# Patient Record
Sex: MALE | Race: WHITE | NOT HISPANIC OR LATINO | Employment: FULL TIME | URBAN - METROPOLITAN AREA
[De-identification: names, ages, dates, MRNs, and addresses within clinical notes are randomized per-mention and may not be internally consistent; named-entity substitution may affect disease eponyms.]

---

## 2018-04-12 ENCOUNTER — HOSPITAL ENCOUNTER (EMERGENCY)
Facility: HOSPITAL | Age: 33
Discharge: HOME | End: 2018-04-13
Attending: EMERGENCY MEDICINE
Payer: COMMERCIAL

## 2018-04-12 DIAGNOSIS — N50.819 TESTICULAR PAIN: Primary | ICD-10-CM

## 2018-04-12 PROCEDURE — 99284 EMERGENCY DEPT VISIT MOD MDM: CPT | Mod: 25

## 2018-04-12 PROCEDURE — 96372 THER/PROPH/DIAG INJ SC/IM: CPT

## 2018-04-12 RX ORDER — OXYCODONE AND ACETAMINOPHEN 5; 325 MG/1; MG/1
1 TABLET ORAL
Status: COMPLETED | OUTPATIENT
Start: 2018-04-12 | End: 2018-04-13

## 2018-04-12 RX ORDER — LORATADINE 10 MG/1
10 TABLET ORAL DAILY
COMMUNITY

## 2018-04-13 VITALS
HEIGHT: 70 IN | RESPIRATION RATE: 18 BRPM | BODY MASS INDEX: 28.63 KG/M2 | OXYGEN SATURATION: 96 % | DIASTOLIC BLOOD PRESSURE: 79 MMHG | HEART RATE: 72 BPM | TEMPERATURE: 98 F | SYSTOLIC BLOOD PRESSURE: 137 MMHG | WEIGHT: 200 LBS

## 2018-04-13 LAB
AMORPH CRY URNS QL MICRO: ABNORMAL
BACTERIA #/AREA URNS HPF: ABNORMAL /HPF
BILIRUB UR QL STRIP: NEGATIVE
C TRACH DNA SPEC QL NAA+PROBE: DETECTED
CLARITY UR: ABNORMAL
COLOR UR: YELLOW
GLUCOSE UR QL STRIP: NEGATIVE
HGB UR QL STRIP: NEGATIVE
KETONES UR QL STRIP: NEGATIVE
LEUKOCYTE ESTERASE UR QL STRIP: ABNORMAL
MICROSCOPIC COMMENT: ABNORMAL
N GONORRHOEA DNA SPEC QL NAA+PROBE: NOT DETECTED
NITRITE UR QL STRIP: NEGATIVE
PH UR STRIP: 7 [PH] (ref 5–8)
PROT UR QL STRIP: NEGATIVE
SP GR UR STRIP: 1.02 (ref 1–1.03)
URN SPEC COLLECT METH UR: ABNORMAL
UROBILINOGEN UR STRIP-ACNC: ABNORMAL EU/DL
WBC #/AREA URNS HPF: 4 /HPF (ref 0–5)

## 2018-04-13 PROCEDURE — 87491 CHLMYD TRACH DNA AMP PROBE: CPT

## 2018-04-13 PROCEDURE — 81000 URINALYSIS NONAUTO W/SCOPE: CPT

## 2018-04-13 PROCEDURE — 63600175 PHARM REV CODE 636 W HCPCS: Performed by: PHYSICIAN ASSISTANT

## 2018-04-13 PROCEDURE — 25000003 PHARM REV CODE 250: Performed by: PHYSICIAN ASSISTANT

## 2018-04-13 PROCEDURE — 87086 URINE CULTURE/COLONY COUNT: CPT

## 2018-04-13 RX ORDER — DOXYCYCLINE 100 MG/1
100 CAPSULE ORAL 2 TIMES DAILY
Qty: 20 CAPSULE | Refills: 0 | Status: SHIPPED | OUTPATIENT
Start: 2018-04-13 | End: 2018-04-23

## 2018-04-13 RX ORDER — CEFTRIAXONE 250 MG/1
250 INJECTION, POWDER, FOR SOLUTION INTRAMUSCULAR; INTRAVENOUS
Status: COMPLETED | OUTPATIENT
Start: 2018-04-13 | End: 2018-04-13

## 2018-04-13 RX ORDER — LIDOCAINE HYDROCHLORIDE 10 MG/ML
5 INJECTION INFILTRATION; PERINEURAL
Status: COMPLETED | OUTPATIENT
Start: 2018-04-13 | End: 2018-04-13

## 2018-04-13 RX ORDER — OXYCODONE AND ACETAMINOPHEN 5; 325 MG/1; MG/1
1 TABLET ORAL EVERY 6 HOURS PRN
Qty: 12 TABLET | Refills: 0 | OUTPATIENT
Start: 2018-04-13 | End: 2019-04-03

## 2018-04-13 RX ADMIN — LIDOCAINE HYDROCHLORIDE 5 ML: 10 INJECTION, SOLUTION INFILTRATION; PERINEURAL at 01:04

## 2018-04-13 RX ADMIN — OXYCODONE HYDROCHLORIDE AND ACETAMINOPHEN 1 TABLET: 5; 325 TABLET ORAL at 12:04

## 2018-04-13 RX ADMIN — CEFTRIAXONE SODIUM 250 MG: 250 INJECTION, POWDER, FOR SOLUTION INTRAMUSCULAR; INTRAVENOUS at 01:04

## 2018-04-13 NOTE — ED NOTES
Pt denies any reaction; pt notified to let nurse know of any symptoms; family member at bedside; pt and family member verbalized understanding

## 2018-04-13 NOTE — DISCHARGE INSTRUCTIONS
Follow-up with urology early next week for reevaluation. Return to this ED if symptoms worsen, if you begin with fever, if any other problems occur.    Take antibiotics as prescribed, testicular elevation, percocet for pain.

## 2018-04-13 NOTE — ED NOTES
Per VANIA Mckoy; wants pt to stay at least 30-45 min after receiving rocephin injection to watch for allergic reaction. Documented allergy was as a child

## 2018-04-13 NOTE — ED TRIAGE NOTES
"Pt states "My right testicle has been swollen for 2 days. It feels like I'm getting punched in it". C/o right testicle pain and swelling, states pain goes up to his pelvic area. Denies dysuria/penile discharge. Denies trauma/abdominal pain. Denies taking meds PTA  "

## 2018-04-13 NOTE — ED PROVIDER NOTES
"Encounter Date: 4/12/2018       History     Chief Complaint   Patient presents with    Testicle Pain     pt reports RIGHT sided testicle pain and swelling starting 2 days ago; pt denies hx with this problems and reports that he has been using BC powder with some relief    Groin Swelling      CC: Testicle Pain, Groin Swelling  HPI: This 33 y.o. male presents to the ED c/o severe constant R testicle pain he noticed when he woke up 2 days ago. Pt states that it had swelled up to the size of grapefruit, but this has improved some today. Pt states that the pain began to radiate to R inguinal region today. He denies any Hx of similar Sx. He works as a  on a ship. He denies difficulty urinating, penile discharge, constipation, abdominal pain. He developed a "bad migraine" today.      The history is provided by the patient.     Review of patient's allergies indicates:   Allergen Reactions    Penicillins      Other reaction(s): Hives     Past Medical History:   Diagnosis Date    Allergy     Astigmatism of both eyes     COPD (chronic obstructive pulmonary disease)     Hyperlipidemia      Past Surgical History:   Procedure Laterality Date    FRACTURE SURGERY  2011    right hand     Family History   Problem Relation Age of Onset    Diabetes Maternal Grandmother     Cancer Maternal Grandfather      lung    Heart disease Paternal Grandmother     Heart disease Paternal Grandfather      Social History   Substance Use Topics    Smoking status: Former Smoker     Packs/day: 1.00     Types: Cigarettes     Quit date: 1/1/2016    Smokeless tobacco: Not on file    Alcohol use No     Review of Systems   Constitutional: Negative for chills and fever.   HENT: Negative for ear pain, rhinorrhea and sore throat.    Eyes: Negative for pain and visual disturbance.   Respiratory: Negative for cough and shortness of breath.    Cardiovascular: Negative for chest pain.   Gastrointestinal: Negative for abdominal pain, " constipation, diarrhea, nausea and vomiting.   Genitourinary: Positive for scrotal swelling (R) and testicular pain (R). Negative for dysuria and flank pain.        (+) R inguinal pain   Musculoskeletal: Negative for arthralgias and myalgias.   Skin: Negative for color change and rash.   Neurological: Positive for headaches. Negative for syncope.       Physical Exam     Initial Vitals [04/12/18 2148]   BP Pulse Resp Temp SpO2   (!) 142/74 70 18 98 °F (36.7 °C) 98 %      MAP       96.67         Physical Exam    Nursing note and vitals reviewed.  Constitutional: He appears well-developed and well-nourished. He is not diaphoretic. No distress.   HENT:   Head: Normocephalic and atraumatic.   Eyes: Conjunctivae and EOM are normal. Pupils are equal, round, and reactive to light.   Neck: Normal range of motion. Neck supple.   Cardiovascular: Normal heart sounds.   Pulmonary/Chest: Breath sounds normal. No respiratory distress. He has no wheezes.   Abdominal: Soft. Bowel sounds are normal. He exhibits no distension and no mass. There is no tenderness. There is no rebound and no guarding. Hernia confirmed negative in the right inguinal area and confirmed negative in the left inguinal area.   Genitourinary: Penis normal. Right testis shows swelling and tenderness. Circumcised. No penile tenderness. No discharge found.   Genitourinary Comments: Mild R-sided posterior scrotal wall erythema/ttp. No bag of worms. No relief with opening book. No relief with elevation of testicle.    Lymphadenopathy: No inguinal adenopathy noted on the right or left side.   Neurological: He is alert and oriented to person, place, and time. He has normal strength.   Skin: Skin is warm and dry. Capillary refill takes less than 2 seconds. No rash and no abscess noted. No erythema.   Psychiatric: He has a normal mood and affect. His behavior is normal. Judgment and thought content normal.         ED Course   Procedures  Labs Reviewed   URINALYSIS -  Abnormal; Notable for the following:        Result Value    Appearance, UA Cloudy (*)     Urobilinogen, UA 2.0-3.0 (*)     Leukocytes, UA Trace (*)     All other components within normal limits   URINALYSIS MICROSCOPIC - Abnormal; Notable for the following:     Bacteria, UA Few (*)     All other components within normal limits   CULTURE, URINE   C. TRACHOMATIS/N. GONORRHOEAE BY AMP DNA             Medical Decision Making:   ED Management:  32yo M with chief complaint R testicular pain and swelling x 3 days. No trauma. No dysuria/hematuria. No penile discharge, pain, or swelling. Pain radiates from inguinal area into groin. D/D includes inguinal hernia, scrotal cellulitis, testicular torsion, epididymitis, orchitis.  Patient overall well-appearing and nontoxic.  No significant belly tenderness on exam.  No inguinal lymphadenopathy.  There is right-sided scrotal swelling with some mild erythema to posterior scrotal wall.  No significant scrotal wall thickening.  R testicle is exquisitely tender.  No bag of worms sensation.  No obvious inguinal hernia.  He denies history of hernia.  He does state that he does some heavy lifting at work.  No urinary complaints. U/S with epididymitis. Pt denies high risk sexual behaviors or anal insertion. Low suspicion for enteric organism. Rocephin and Doxycycline. Pt to f/u with urology. Percocet for pain. He does understand and agree. Vitals reassuring.  Return precautions given.  Urine cultured.  Other:   I have discussed this case with another health care provider.       <> Summary of the Discussion: I have discussed this case with .                      Clinical Impression:   The encounter diagnosis was Testicular pain.    Disposition:   Disposition: Discharged  Condition: Stable                        Ean Church PA-C  04/13/18 0605

## 2018-04-15 LAB — BACTERIA UR CULT: NO GROWTH

## 2019-04-03 ENCOUNTER — HOSPITAL ENCOUNTER (EMERGENCY)
Facility: HOSPITAL | Age: 34
Discharge: HOME | End: 2019-04-03
Attending: EMERGENCY MEDICINE
Payer: COMMERCIAL

## 2019-04-03 VITALS
DIASTOLIC BLOOD PRESSURE: 88 MMHG | TEMPERATURE: 98 F | WEIGHT: 210 LBS | HEIGHT: 70 IN | BODY MASS INDEX: 30.06 KG/M2 | SYSTOLIC BLOOD PRESSURE: 136 MMHG | RESPIRATION RATE: 18 BRPM | OXYGEN SATURATION: 99 % | HEART RATE: 74 BPM

## 2019-04-03 DIAGNOSIS — R60.9 SWELLING: ICD-10-CM

## 2019-04-03 DIAGNOSIS — M23.90 DERANGEMENT OF KNEE, UNSPECIFIED LATERALITY: Primary | ICD-10-CM

## 2019-04-03 DIAGNOSIS — T14.90XA TRAUMA: ICD-10-CM

## 2019-04-03 LAB
ALBUMIN SERPL-MCNC: 4.2 G/DL
ALP SERPL-CCNC: 66 U/L
BILIRUB SERPL-MCNC: 0.7 MG/DL
BUN SERPL-MCNC: 14 MG/DL
CALCIUM SERPL-MCNC: 9.7 MG/DL
CHLORIDE SERPL-SCNC: 104 MMOL/L
CREAT SERPL-MCNC: 0.9 MG/DL
GLUCOSE SERPL-MCNC: 96 MG/DL (ref 70–110)
POC ALT (SGPT): 71 U/L
POC AST (SGOT): 48 U/L
POC PTINR: 1.1 (ref 0.9–1.2)
POC PTWBT: 12.6 SEC (ref 9.7–14.3)
POC TCO2: 26 MMOL/L
POTASSIUM BLD-SCNC: 3.8 MMOL/L
PROTEIN, POC: 7.5 G/DL
SAMPLE: NORMAL
SODIUM BLD-SCNC: 146 MMOL/L

## 2019-04-03 PROCEDURE — 99284 EMERGENCY DEPT VISIT MOD MDM: CPT | Mod: 25,ER

## 2019-04-03 PROCEDURE — 80053 COMPREHEN METABOLIC PANEL: CPT | Mod: ER

## 2019-04-03 PROCEDURE — 85610 PROTHROMBIN TIME: CPT | Mod: ER

## 2019-04-03 PROCEDURE — 29505 APPLICATION LONG LEG SPLINT: CPT | Mod: LT,ER

## 2019-04-03 PROCEDURE — 85025 COMPLETE CBC W/AUTO DIFF WBC: CPT | Mod: ER

## 2019-04-03 RX ORDER — IBUPROFEN 800 MG/1
800 TABLET ORAL EVERY 8 HOURS PRN
Qty: 30 TABLET | Refills: 0 | Status: SHIPPED | OUTPATIENT
Start: 2019-04-03 | End: 2021-07-21 | Stop reason: CLARIF

## 2019-04-03 RX ORDER — OXYCODONE AND ACETAMINOPHEN 10; 325 MG/1; MG/1
1 TABLET ORAL EVERY 4 HOURS PRN
Qty: 18 TABLET | Refills: 0 | Status: SHIPPED | OUTPATIENT
Start: 2019-04-03 | End: 2021-07-21 | Stop reason: CLARIF

## 2019-04-03 NOTE — ED NOTES
Patient stated he was walking and fell last Friday and hit left knee   @ days ago patient noticed left leg was swollen and tturning ecchymotic

## 2019-04-03 NOTE — ED PROVIDER NOTES
"Encounter Date: 4/3/2019    SCRIBE #1 NOTE: I, Marilyn Ferrari, am scribing for, and in the presence of,  Glenan Dyson NP. I have scribed the following portions of the note - Other sections scribed: HPI, ROS, PE.       History     Chief Complaint   Patient presents with    Leg Swelling     pt reports swelling to left lower leg that started yesterday and a purple coloring that started today just below left knee. He states he did hurt his left knee 1 week ago and the swelling has resolved     Michael Mcadams is a 34 y.o. male who  has a past medical history of Allergy, Astigmatism of both eyes, COPD (chronic obstructive pulmonary disease), and Hyperlipidemia.    The patient presents to the ED due to left leg swelling with an onset of yesterday. The patient reports of a trip and fall that occurred last week. He reports his left knee cap hit the stairs after he fell. He denies head injury or LOC. He reports of left knee pain with bruising and swelling at time of incident, but has resolved. However, the patient reports his symptoms have returned with increased swelling to LLE since yesterday. He states, "my skin feels hot, tight, and numb." He reports of purple discoloration to left thigh that began approximately 1 hour ago. He reports of no relief with Ibuprofen. The patient denies SOB, chest pain, nausea, or any other complaint at this time.    The history is provided by the patient.     Review of patient's allergies indicates:   Allergen Reactions    Penicillins      Other reaction(s): Hives    Soma [carisoprodol] Hives     Past Medical History:   Diagnosis Date    Allergy     Astigmatism of both eyes     COPD (chronic obstructive pulmonary disease)     Hyperlipidemia      Past Surgical History:   Procedure Laterality Date    FRACTURE SURGERY  2011    right hand     Family History   Problem Relation Age of Onset    Diabetes Maternal Grandmother     Cancer Maternal Grandfather         lung    Heart disease " Paternal Grandmother     Heart disease Paternal Grandfather      Social History     Tobacco Use    Smoking status: Former Smoker     Packs/day: 1.00     Types: Cigarettes     Last attempt to quit: 1/1/2016     Years since quitting: 3.2   Substance Use Topics    Alcohol use: No    Drug use: No     Review of Systems   Constitutional: Negative for chills and fever.   HENT: Negative for congestion, ear pain, rhinorrhea and sore throat.    Respiratory: Negative for cough, shortness of breath and wheezing.    Cardiovascular: Positive for leg swelling (left leg). Negative for chest pain and palpitations.   Gastrointestinal: Negative for abdominal pain, diarrhea, nausea and vomiting.   Genitourinary: Negative for dysuria and hematuria.   Musculoskeletal: Positive for joint swelling. Negative for back pain, myalgias and neck pain.        (+) left knee pain   Skin: Positive for color change. Negative for rash.   Neurological: Negative for dizziness, weakness, light-headedness and headaches.   Psychiatric/Behavioral: Negative for confusion.       Physical Exam     Initial Vitals [04/03/19 1810]   BP Pulse Resp Temp SpO2   (!) 144/93 74 18 97.8 °F (36.6 °C) 99 %      MAP       --         Physical Exam    Nursing note and vitals reviewed.  Constitutional: He appears well-developed and well-nourished.   HENT:   Head: Normocephalic and atraumatic.   Eyes: EOM are normal. Pupils are equal, round, and reactive to light.   Neck: Normal range of motion. Neck supple.   Cardiovascular: Normal rate, regular rhythm and normal heart sounds.   Pulmonary/Chest: Breath sounds normal. No respiratory distress.   Abdominal: Soft. Bowel sounds are normal. There is no tenderness.   Musculoskeletal: Normal range of motion. He exhibits edema and tenderness.   significant edema to LLE  Left knee with effusion  Moderate ecchymosis to lateral and posterior aspect of left knee.    Neurological: He is alert and oriented to person, place, and time. He  has normal strength.   Skin: Skin is warm and dry. Capillary refill takes less than 2 seconds.         ED Course   Procedures  Labs Reviewed   POCT CBC   POCT PROTIME-INR   POCT CMP   ISTAT PROCEDURE   POCT CMP          Imaging Results          US Lower Extremity Veins Left (Final result)  Result time 04/03/19 19:51:00   Procedure changed from US Lower Extremity Veins Bilateral     Final result by Bianca Pizarro MD (04/03/19 19:51:00)                 Impression:      No evidence of left lower extremity deep venous thrombosis.      Electronically signed by: Bianca Pizarro  Date:    04/03/2019  Time:    19:51             Narrative:    EXAMINATION:  Ultrasound lower extremity veins left    CLINICAL HISTORY:  Swelling    TECHNIQUE:  Grayscale imaging of the left lower extremity to evaluate the deep and superficial venous system with color Doppler interrogation and Spectral Doppler wave form analysis was performed.    COMPARISON:  None.    FINDINGS:  There is normal color Doppler interrogation, compression and distal augmentation of the left common femoral, femoral, popliteal, gastrocnemius, posterior tibial, peroneal and saphenous veins.  The left femoral vein is duplicated.  Pitting edema is noted at the shin area.                               X-Ray Knee 3 View Left (Final result)  Result time 04/03/19 19:04:25    Final result by Adrien Pacheco MD (04/03/19 19:04:25)                 Impression:      1. No acute displaced fracture or dislocation of the knee.      Electronically signed by: Adrien Pacheco MD  Date:    04/03/2019  Time:    19:04             Narrative:    EXAMINATION:  XR KNEE 3 VIEW LEFT    CLINICAL HISTORY:  Injury, unspecified, initial encounter    TECHNIQUE:  AP, lateral, and Merchant views of the left knee were performed.    COMPARISON:  None    FINDINGS:  Three views.    No acute displaced fracture or dislocation of the knee.  No radiopaque foreign body.  No large knee joint effusion.   There may be a non ossified fibroma involving the lateral aspect of the proximal tibia.                                 Medical Decision Making:   Initial Assessment:   34-year-old male presents with left leg swelling since yesterday.  Differential Diagnosis:   DVT, contusion, effusion  Clinical Tests:   Lab Tests: Ordered and Reviewed  Radiological Study: Ordered and Reviewed  ED Management:  Diagnosis management comments: This is an urgent evaluation of a  that presented to the ER with c/o . Pts exam was as above.     Labs  were reviewed and discussed with pt.  INR is within normal limits. Platelets are normal at 226.  H&H is at 14/42.  Liver enzymes are slightly 71/48 however I do not believe there responsible for this increase in ecchymosis.    Based on history today's exam I believe most likely there was a derangement of the knee 2 weeks ago with injury and the bleeding/ecchymosis is finally left the knee capsule as tracking down the calf.  There is no DVT on ultrasound.  There is no fracture on x-ray.  I will place patient in a knee immobilizer and sent him out with pain control and instructions to follow up with Orthopedics.    Based on exam today - I have low suspicion for medical, surgical or other life threatening condition and I believe pt is safe for discharge and outpatient f/u.    Pt verbalizes understanding of d/c instructions and will return for worsening condition.    Dr Brambila, ED Attending, consulted, examined the patient independently and agrees with assessment and plan.                      Clinical Impression:       ICD-10-CM ICD-9-CM   1. Derangement of knee, unspecified laterality M23.90 717.9   2. Swelling R60.9 782.3   3. Trauma T14.90XA 959.9     Disposition:   Disposition: Discharged  Condition: Stable    I, CHRISTINE Dyson, personally performed the services described in this documentation.  All medical record entries made by the scribe were at my direction and in my presence.  I have reviewed  the chart and agree that the record reflects my personal performance and is accurate and complete.                       Glenna Dyson NP  04/03/19 2047

## 2020-07-13 ENCOUNTER — HOSPITAL ENCOUNTER (EMERGENCY)
Facility: HOSPITAL | Age: 35
Discharge: HOME | End: 2020-07-13
Attending: EMERGENCY MEDICINE
Payer: OTHER MISCELLANEOUS

## 2020-07-13 VITALS
OXYGEN SATURATION: 95 % | HEART RATE: 74 BPM | BODY MASS INDEX: 30.78 KG/M2 | WEIGHT: 215 LBS | DIASTOLIC BLOOD PRESSURE: 71 MMHG | RESPIRATION RATE: 18 BRPM | HEIGHT: 70 IN | TEMPERATURE: 98 F | SYSTOLIC BLOOD PRESSURE: 121 MMHG

## 2020-07-13 DIAGNOSIS — S90.32XA CONTUSION OF LEFT HEEL, INITIAL ENCOUNTER: Primary | ICD-10-CM

## 2020-07-13 DIAGNOSIS — T07.XXXA MULTIPLE ABRASIONS: ICD-10-CM

## 2020-07-13 DIAGNOSIS — R52 PAIN: ICD-10-CM

## 2020-07-13 DIAGNOSIS — S90.31XA CONTUSION OF RIGHT HEEL, INITIAL ENCOUNTER: ICD-10-CM

## 2020-07-13 PROCEDURE — 63600175 PHARM REV CODE 636 W HCPCS: Performed by: NURSE PRACTITIONER

## 2020-07-13 PROCEDURE — 90715 TDAP VACCINE 7 YRS/> IM: CPT | Performed by: NURSE PRACTITIONER

## 2020-07-13 PROCEDURE — 25000003 PHARM REV CODE 250: Performed by: NURSE PRACTITIONER

## 2020-07-13 PROCEDURE — 90471 IMMUNIZATION ADMIN: CPT | Performed by: NURSE PRACTITIONER

## 2020-07-13 PROCEDURE — 99284 EMERGENCY DEPT VISIT MOD MDM: CPT | Mod: 25

## 2020-07-13 RX ORDER — OXYCODONE AND ACETAMINOPHEN 5; 325 MG/1; MG/1
1 TABLET ORAL
Status: COMPLETED | OUTPATIENT
Start: 2020-07-13 | End: 2020-07-13

## 2020-07-13 RX ORDER — LIDOCAINE HYDROCHLORIDE 10 MG/ML
10 INJECTION INFILTRATION; PERINEURAL
Status: COMPLETED | OUTPATIENT
Start: 2020-07-13 | End: 2020-07-13

## 2020-07-13 RX ORDER — HYDROCODONE BITARTRATE AND ACETAMINOPHEN 5; 325 MG/1; MG/1
1 TABLET ORAL EVERY 6 HOURS PRN
Qty: 12 TABLET | Refills: 0 | Status: SHIPPED | OUTPATIENT
Start: 2020-07-13 | End: 2021-07-21 | Stop reason: CLARIF

## 2020-07-13 RX ADMIN — CLOSTRIDIUM TETANI TOXOID ANTIGEN (FORMALDEHYDE INACTIVATED), CORYNEBACTERIUM DIPHTHERIAE TOXOID ANTIGEN (FORMALDEHYDE INACTIVATED), BORDETELLA PERTUSSIS TOXOID ANTIGEN (GLUTARALDEHYDE INACTIVATED), BORDETELLA PERTUSSIS FILAMENTOUS HEMAGGLUTININ ANTIGEN (FORMALDEHYDE INACTIVATED), BORDETELLA PERTUSSIS PERTACTIN ANTIGEN, AND BORDETELLA PERTUSSIS FIMBRIAE 2/3 ANTIGEN 0.5 ML: 5; 2; 2.5; 5; 3; 5 INJECTION, SUSPENSION INTRAMUSCULAR at 06:07

## 2020-07-13 RX ADMIN — LIDOCAINE HYDROCHLORIDE 10 ML: 10 INJECTION, SOLUTION INFILTRATION; PERINEURAL at 06:07

## 2020-07-13 RX ADMIN — OXYCODONE HYDROCHLORIDE AND ACETAMINOPHEN 1 TABLET: 5; 325 TABLET ORAL at 06:07

## 2020-07-13 NOTE — PROVIDER PROGRESS NOTES - EMERGENCY DEPT.
Emergency Department TeleTRIAGE Encounter Note      CHIEF COMPLAINT    Chief Complaint   Patient presents with    Ankle Pain     Pt c/o right ankle pain secondary to a accident at work a wire had snapped and swapped across pt ankle and knee. minimal laceration tp right heel bleeding controlled with bandage       VITAL SIGNS   Initial Vitals [07/13/20 1435]   BP Pulse Resp Temp SpO2   132/82 104 18 98 °F (36.7 °C) 95 %      MAP       --            ALLERGIES    Review of patient's allergies indicates:   Allergen Reactions    Penicillins      Other reaction(s): Hives    Soma [carisoprodol] Hives       PROVIDER TRIAGE NOTE  This is a teletriage evaluation of a 35 y.o. male presenting to the ED with c/o right knee and heel pain following an injury.  He reports a large 1 in wire snapped back and hit him in the knee and heel.  Has laceration to the heel.  Does not recall his last tetanus.. Initial orders will be placed and care will be transferred to an alternate provider when patient is roomed for a full evaluation. Any additional orders and the final disposition will be determined by that provider.         ORDERS  Labs Reviewed - No data to display    ED Orders (720h ago, onward)    Start Ordered     Status Ordering Provider    07/14/20 0600 07/13/20 1459  Wound care routine - Clean wound  Daily     Comments: Clean Wound    Ordered DEJAN GOULD    07/14/20 0600 07/13/20 1459  Wound care routine - Irrigate wound  Daily     Comments: Irrigate Wound    Ordered DEJAN GOULD    07/14/20 0600 07/13/20 1459  Wound care routine - Sterile Gloves to Bedside  Daily     Comments: Provide sterile gloves to bedside    Ordered DEJAN GOULD    07/13/20 1500 07/13/20 1459  X-Ray Knee 3 View Right  1 time imaging      Ordered DEJAN GOULD    07/13/20 1500 07/13/20 1459  X-Ray Foot Complete Right  1 time imaging      Ordered DEJAN GOULD    07/13/20 1500 07/13/20 1459  Provide suture tray to patient bedside  Once       Ordered DEJAN GOULD    07/13/20 1500 07/13/20 1459  Change dressing - apply dry sterile dressing  Once     Comments: Apply dry sterile dressing.    Ordered DEJAN GOULD    07/13/20 1500 07/13/20 1459  lidocaine HCL 10 mg/ml (1%) injection 10 mL  ED 1 Time      Ordered DEJAN GOULD    07/13/20 1500 07/13/20 1459  Tdap vaccine injection 0.5 mL  ED 1 Time      Ordered DEJAN GOULD            Virtual Visit Note: The provider triage portion of this emergency department evaluation and documentation was performed via [employer], a HIPAA-compliant telemedicine application, in concert with a tele-presenter in the room. A face to face patient evaluation with one of my colleagues will occur once the patient is placed in an emergency department room.      DISCLAIMER: This note was prepared with [employer] voice recognition transcription software. Garbled syntax, mangled pronouns, and other bizarre constructions may be attributed to that software system.

## 2020-07-13 NOTE — ED TRIAGE NOTES
Pt. presents to the ED with c/o left ankle and right knee pain r/t accident with wire at work, patient denies taking meds PTA. NAD noted.

## 2020-07-14 NOTE — ED PROVIDER NOTES
Encounter Date: 2020       History     Chief Complaint   Patient presents with    Ankle Pain     Pt c/o right ankle pain secondary to a accident at work a wire had snapped and swapped across pt ankle and knee. minimal laceration tp right heel bleeding controlled with bandage     HPI   Patient is a 35-year-old male who states that he was working with metal cable which broke under high-pressure and snapped injuring his left knee and right heel.  He reports cuts to the right heel as well as pain with pressure.  He also reports pain to the left knee when bending it.  Current severity pain is 8/10.  Patient reports his tetanus shot was not up-to-date.    Review of patient's allergies indicates:   Allergen Reactions    Penicillins      Other reaction(s): Hives    Soma [carisoprodol] Hives     Past Medical History:   Diagnosis Date    Allergy     Astigmatism of both eyes     COPD (chronic obstructive pulmonary disease)     Hyperlipidemia      Past Surgical History:   Procedure Laterality Date    FRACTURE SURGERY  2011    right hand     Family History   Problem Relation Age of Onset    Diabetes Maternal Grandmother     Cancer Maternal Grandfather         lung    Heart disease Paternal Grandmother     Heart disease Paternal Grandfather      Social History     Tobacco Use    Smoking status: Former Smoker     Packs/day: 1.00     Types: Cigarettes     Quit date: 2016     Years since quittin.5   Substance Use Topics    Alcohol use: No    Drug use: No     Review of Systems   Constitutional: Negative for appetite change, chills, diaphoresis, fatigue and fever.   HENT: Negative for congestion, ear discharge, ear pain, postnasal drip, rhinorrhea, sinus pressure, sneezing, sore throat and voice change.    Eyes: Negative for discharge, itching and visual disturbance.   Respiratory: Negative for cough, shortness of breath and wheezing.    Cardiovascular: Negative for chest pain, palpitations and leg  swelling.   Gastrointestinal: Negative for abdominal pain, nausea and vomiting.   Endocrine: Negative for polydipsia, polyphagia and polyuria.   Genitourinary: Negative for difficulty urinating, discharge, dysuria, frequency, hematuria, penile pain, penile swelling and urgency.   Musculoskeletal: Positive for arthralgias. Negative for myalgias.   Skin: Negative for rash and wound.        Cuts to the right heel   Neurological: Negative for dizziness, seizures, syncope and weakness.   Hematological: Negative for adenopathy. Does not bruise/bleed easily.   Psychiatric/Behavioral: Negative for agitation and self-injury. The patient is not nervous/anxious.        Physical Exam     Initial Vitals [07/13/20 1435]   BP Pulse Resp Temp SpO2   132/82 104 18 98 °F (36.7 °C) 95 %      MAP       --         Physical Exam    Nursing note and vitals reviewed.  Constitutional: He appears well-developed and well-nourished. He is not diaphoretic. No distress.   HENT:   Head: Normocephalic and atraumatic.   Right Ear: External ear normal.   Left Ear: External ear normal.   Nose: Nose normal.   Eyes: Pupils are equal, round, and reactive to light. Right eye exhibits no discharge. Left eye exhibits no discharge. No scleral icterus.   Neck: Normal range of motion.   Pulmonary/Chest: No respiratory distress.   Abdominal: He exhibits no distension.   Musculoskeletal:      Left knee: He exhibits decreased range of motion (Secondary to discomfort). He exhibits no swelling, no effusion, no ecchymosis, no deformity, no laceration, no erythema, normal alignment, no LCL laxity, normal patellar mobility, no bony tenderness, normal meniscus and no MCL laxity. Tenderness (Generalized) found.        Right foot: Tenderness (To the heel with abrasions noted) present. No bony tenderness, swelling, crepitus, deformity or laceration.   Neurological: He is alert and oriented to person, place, and time.   Skin: Skin is dry. Capillary refill takes less than  2 seconds.         ED Course   Procedures  Labs Reviewed - No data to display       Imaging Results          X-Ray Foot Complete Right (Final result)  Result time 07/13/20 19:52:15    Final result by Charli Shields MD (07/13/20 19:52:15)                 Impression:      No acute process.      Electronically signed by: Charli Shields MD  Date:    07/13/2020  Time:    19:52             Narrative:    EXAMINATION:  XR FOOT COMPLETE 3 VIEW RIGHT    CLINICAL HISTORY:  Acute trauma.    TECHNIQUE:  AP, lateral, and oblique views of the right foot were performed.    COMPARISON:  None    FINDINGS:  The bone mineralization is within normal limits.  There is no cortical step-off.  There is no evidence of periostitis.    The joint spaces are maintained.  The Lisfranc articulation is within normal limits.  The soft tissues are unremarkable.  No radiopaque foreign body is present.    There is no evidence of fracture or dislocation.                               X-Ray Knee 1 or 2 View Left (Final result)  Result time 07/13/20 19:49:31   Procedure changed from X-Ray Knee 1 or 2 View Right     Final result by Adrien Pacheco MD (07/13/20 19:49:31)                 Impression:      1. No acute displaced fracture or dislocation of the knee allowing for cross-table lateral technique.      Electronically signed by: Adrien Pacheco MD  Date:    07/13/2020  Time:    19:49             Narrative:    EXAMINATION:  XR KNEE 1 OR 2 VIEW LEFT    CLINICAL HISTORY:  pain;  Pain, unspecified    TECHNIQUE:  None    COMPARISON:  04/03/2019    FINDINGS:  No acute displaced fracture or dislocation of the knee.  No radiopaque foreign body.  There is a sclerotic focus along the lateral margin of the proximal tibia, suggesting nonossifying fibroma, stable.  No large knee joint effusion.                                       APC / Resident Notes:   35-year-old male states that a metal cable snapped causing injury to his right heel and his left knee.  On  initial assessment I visualized minor superficial lacerations to the right heel but there are no other signs of trauma including ecchymosis.  There is extreme tenderness to the right heel as well as to the left knee.  Patient is resistant to ranging the left knee.  Distal pulses sensation and movement are present to both injuries.  Patient is afebrile and nontoxic.  Differential diagnosis includes fracture, dislocation, sprain, strain, foreign body.  X-rays were ordered of both locations.  X-rays indicated no fracture or dislocation to both injuries.  I ordered wound care for the superficial abrasions noted to the right heel.  There is no redness or swelling as well as exudate to those abrasions.  I have ordered an Ace wrap for the left knee and a bulky dressing to the right heel.  On further assessment there was no tenderness to the Achilles tendon, negative Martell test was observed.  Patient is discharged home in good condition to follow up with primary care or Orthopedics and return for any worsening or changes in condition.  Norco was prescribed for pain relief.See above for analyses of radiology, labs, and events during pt's visit and direct actions taken. Symptomatic therapies and return precautions on AVS.   Medication choices were made after reviewing allergies, medications, history, available laboratories. See below for discharge prescriptions if any and disposition.  Tetanus was updated while the patient was present.              ED Course as of Jul 13 2024   Mon Jul 13, 2020   1833 35-year-old male who reports that a metal rope snapped hitting him in the right heel and left knee.  He reports pain to both locations.  The right heel has superficial lacerations without bleeding at this time, no redness warmth or drainage.  There is no tenderness about the anterior ankle or 5th metatarsal space.  Distal pulse sensation and movement are intact in the foot.  Left knee distal pulse sensation and movement are  intact, patient is able to perform micro movements within the but cannot fully range it.  I have ordered x-rays of both the right foot and left knee, pain medication for the patient's relief and Adacel for tetanus prophylaxis.  Awaiting results.  Differential diagnosis includes fracture, dislocation, sprain, strain, foreign body.    [VC]   1958 No acute process.   X-Ray Foot Complete Right [VC]   1958 No acute displaced fracture or dislocation of the knee allowing for cross-table lateral technique.      X-Ray Knee 1 or 2 View Left [VC]      ED Course User Index  [VC] Trace Markham DNP                Clinical Impression:       ICD-10-CM ICD-9-CM   1. Contusion of left heel, initial encounter  S90.32XA 924.20   2. Pain  R52 780.96   3. Multiple abrasions  T07.XXXA 919.0   4. Contusion of right heel, initial encounter  S90.31XA 924.20         Disposition:   Disposition: Discharged  Condition: Stable     ED Disposition Condition    Discharge Stable        ED Prescriptions     Medication Sig Dispense Start Date End Date Auth. Provider    HYDROcodone-acetaminophen (NORCO) 5-325 mg per tablet Take 1 tablet by mouth every 6 (six) hours as needed. 12 tablet 7/13/2020  Trace Markham DNP        Follow-up Information     Follow up With Specialties Details Why Contact Info    Rick Sultana MD Family Medicine Schedule an appointment as soon as possible for a visit   [address] [place] [address]  [id number]  [place] [address]  [phone]                                       Trace Markham DNP  07/13/20 2024

## 2021-05-04 ENCOUNTER — PATIENT MESSAGE (OUTPATIENT)
Dept: RESEARCH | Facility: HOSPITAL | Age: 36
End: 2021-05-04

## 2021-07-21 ENCOUNTER — HOSPITAL ENCOUNTER (EMERGENCY)
Facility: HOSPITAL | Age: 36
Discharge: HOME | End: 2021-07-21
Attending: EMERGENCY MEDICINE
Payer: COMMERCIAL

## 2021-07-21 VITALS
DIASTOLIC BLOOD PRESSURE: 100 MMHG | HEIGHT: 70 IN | SYSTOLIC BLOOD PRESSURE: 142 MMHG | WEIGHT: 235 LBS | TEMPERATURE: 99 F | RESPIRATION RATE: 20 BRPM | OXYGEN SATURATION: 96 % | HEART RATE: 70 BPM | BODY MASS INDEX: 33.64 KG/M2

## 2021-07-21 DIAGNOSIS — K76.0 STEATOSIS OF LIVER: ICD-10-CM

## 2021-07-21 DIAGNOSIS — K92.2 LOWER GI BLEED: Primary | ICD-10-CM

## 2021-07-21 LAB
ALBUMIN SERPL BCP-MCNC: 4.9 G/DL (ref 3.5–5.2)
ALP SERPL-CCNC: 74 U/L (ref 55–135)
ALT SERPL W/O P-5'-P-CCNC: 89 U/L (ref 10–44)
ANION GAP SERPL CALC-SCNC: 11 MMOL/L (ref 8–16)
AST SERPL-CCNC: 43 U/L (ref 10–40)
BASOPHILS # BLD AUTO: 0.02 K/UL (ref 0–0.2)
BASOPHILS NFR BLD: 0.2 % (ref 0–1.9)
BILIRUB SERPL-MCNC: 0.8 MG/DL (ref 0.1–1)
BUN SERPL-MCNC: 11 MG/DL (ref 6–20)
CALCIUM SERPL-MCNC: 10 MG/DL (ref 8.7–10.5)
CHLORIDE SERPL-SCNC: 107 MMOL/L (ref 95–110)
CO2 SERPL-SCNC: 25 MMOL/L (ref 23–29)
CREAT SERPL-MCNC: 1 MG/DL (ref 0.5–1.4)
DIFFERENTIAL METHOD: ABNORMAL
EOSINOPHIL # BLD AUTO: 0.3 K/UL (ref 0–0.5)
EOSINOPHIL NFR BLD: 3.1 % (ref 0–8)
ERYTHROCYTE [DISTWIDTH] IN BLOOD BY AUTOMATED COUNT: 12.9 % (ref 11.5–14.5)
EST. GFR  (AFRICAN AMERICAN): >60 ML/MIN/1.73 M^2
EST. GFR  (NON AFRICAN AMERICAN): >60 ML/MIN/1.73 M^2
GLUCOSE SERPL-MCNC: 86 MG/DL (ref 70–110)
HCT VFR BLD AUTO: 44.8 % (ref 40–54)
HGB BLD-MCNC: 15.9 G/DL (ref 14–18)
IMM GRANULOCYTES # BLD AUTO: 0.02 K/UL (ref 0–0.04)
IMM GRANULOCYTES NFR BLD AUTO: 0.2 % (ref 0–0.5)
LYMPHOCYTES # BLD AUTO: 2.3 K/UL (ref 1–4.8)
LYMPHOCYTES NFR BLD: 28.2 % (ref 18–48)
MCH RBC QN AUTO: 31.3 PG (ref 27–31)
MCHC RBC AUTO-ENTMCNC: 35.5 G/DL (ref 32–36)
MCV RBC AUTO: 88 FL (ref 82–98)
MONOCYTES # BLD AUTO: 0.7 K/UL (ref 0.3–1)
MONOCYTES NFR BLD: 8.5 % (ref 4–15)
NEUTROPHILS # BLD AUTO: 4.8 K/UL (ref 1.8–7.7)
NEUTROPHILS NFR BLD: 59.8 % (ref 38–73)
NRBC BLD-RTO: 0 /100 WBC
OB PNL STL: POSITIVE
PLATELET # BLD AUTO: 241 K/UL (ref 150–450)
PMV BLD AUTO: 10.9 FL (ref 9.2–12.9)
POTASSIUM SERPL-SCNC: 4.1 MMOL/L (ref 3.5–5.1)
PROT SERPL-MCNC: 8.3 G/DL (ref 6–8.4)
RBC # BLD AUTO: 5.08 M/UL (ref 4.6–6.2)
SODIUM SERPL-SCNC: 143 MMOL/L (ref 136–145)
WBC # BLD AUTO: 8.11 K/UL (ref 3.9–12.7)

## 2021-07-21 PROCEDURE — 25500020 PHARM REV CODE 255: Performed by: EMERGENCY MEDICINE

## 2021-07-21 PROCEDURE — 85025 COMPLETE CBC W/AUTO DIFF WBC: CPT | Performed by: NURSE PRACTITIONER

## 2021-07-21 PROCEDURE — 80053 COMPREHEN METABOLIC PANEL: CPT | Performed by: NURSE PRACTITIONER

## 2021-07-21 PROCEDURE — 74177 CT ABDOMEN PELVIS WITH CONTRAST: ICD-10-PCS | Mod: 26,,, | Performed by: RADIOLOGY

## 2021-07-21 PROCEDURE — 82272 OCCULT BLD FECES 1-3 TESTS: CPT | Performed by: NURSE PRACTITIONER

## 2021-07-21 PROCEDURE — 99285 EMERGENCY DEPT VISIT HI MDM: CPT

## 2021-07-21 PROCEDURE — 74177 CT ABD & PELVIS W/CONTRAST: CPT | Mod: 26,,, | Performed by: RADIOLOGY

## 2021-07-21 PROCEDURE — 74177 CT ABD & PELVIS W/CONTRAST: CPT | Mod: TC

## 2021-07-21 RX ADMIN — IOHEXOL 100 ML: 350 INJECTION, SOLUTION INTRAVENOUS at 06:07
